# Patient Record
Sex: FEMALE | Race: WHITE | Employment: FULL TIME | URBAN - METROPOLITAN AREA
[De-identification: names, ages, dates, MRNs, and addresses within clinical notes are randomized per-mention and may not be internally consistent; named-entity substitution may affect disease eponyms.]

---

## 2018-01-25 ENCOUNTER — TELEPHONE (OUTPATIENT)
Dept: CARDIOLOGY CLINIC | Age: 58
End: 2018-01-25

## 2018-01-25 PROBLEM — I20.0 UNSTABLE ANGINA (HCC): Status: ACTIVE | Noted: 2018-01-25

## 2022-08-26 ENCOUNTER — APPOINTMENT (OUTPATIENT)
Dept: CT IMAGING | Age: 62
End: 2022-08-26
Payer: COMMERCIAL

## 2022-08-26 ENCOUNTER — HOSPITAL ENCOUNTER (EMERGENCY)
Age: 62
Discharge: HOME | End: 2022-08-27
Attending: STUDENT IN AN ORGANIZED HEALTH CARE EDUCATION/TRAINING PROGRAM
Payer: COMMERCIAL

## 2022-08-26 ENCOUNTER — APPOINTMENT (OUTPATIENT)
Dept: GENERAL RADIOLOGY | Age: 62
End: 2022-08-26
Payer: COMMERCIAL
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
## 2022-08-26 DIAGNOSIS — S09.90XA INJURY OF HEAD, INITIAL ENCOUNTER: ICD-10-CM

## 2022-08-26 LAB
ANION GAP SERPL CALCULATED.3IONS-SCNC: 14 MMOL/L (ref 3–16)
BASOPHILS ABSOLUTE: 0.1 K/UL (ref 0–0.2)
BASOPHILS RELATIVE PERCENT: 0.9 %
BUN BLDV-MCNC: 16 MG/DL (ref 7–20)
CALCIUM SERPL-MCNC: 9.5 MG/DL (ref 8.3–10.6)
CHLORIDE BLD-SCNC: 97 MMOL/L (ref 99–110)
CO2: 25 MMOL/L (ref 21–32)
CREAT SERPL-MCNC: 0.6 MG/DL (ref 0.6–1.1)
EOSINOPHILS ABSOLUTE: 0.1 K/UL (ref 0–0.6)
EOSINOPHILS RELATIVE PERCENT: 1.6 %
GFR AFRICAN AMERICAN: >60
GFR NON-AFRICAN AMERICAN: >60
GLUCOSE BLD-MCNC: 89 MG/DL (ref 70–99)
HCT VFR BLD CALC: 38.1 % (ref 36–48)
HEMOGLOBIN: 13 G/DL (ref 12–16)
INR BLD: 1.27 (ref 0.87–1.14)
LYMPHOCYTES ABSOLUTE: 2.6 K/UL (ref 1–5.1)
LYMPHOCYTES RELATIVE PERCENT: 44.5 %
MAGNESIUM: 2 MG/DL (ref 1.8–2.4)
MCH RBC QN AUTO: 31.7 PG (ref 26–34)
MCHC RBC AUTO-ENTMCNC: 34 G/DL (ref 31–36)
MCV RBC AUTO: 93.1 FL (ref 80–100)
MONOCYTES ABSOLUTE: 0.5 K/UL (ref 0–1.3)
MONOCYTES RELATIVE PERCENT: 9.2 %
NEUTROPHILS ABSOLUTE: 2.5 K/UL (ref 1.7–7.7)
NEUTROPHILS RELATIVE PERCENT: 43.8 %
PDW BLD-RTO: 13 % (ref 12.4–15.4)
PLATELET # BLD: 351 K/UL (ref 135–450)
PMV BLD AUTO: 6.7 FL (ref 5–10.5)
POTASSIUM SERPL-SCNC: 4.6 MMOL/L (ref 3.5–5.1)
PROTHROMBIN TIME: 15.8 SEC (ref 11.7–14.5)
RBC # BLD: 4.1 M/UL (ref 4–5.2)
SODIUM BLD-SCNC: 136 MMOL/L (ref 136–145)
WBC # BLD: 5.8 K/UL (ref 4–11)

## 2022-08-26 PROCEDURE — 36415 COLL VENOUS BLD VENIPUNCTURE: CPT

## 2022-08-26 PROCEDURE — 73590 X-RAY EXAM OF LOWER LEG: CPT

## 2022-08-26 PROCEDURE — 85610 PROTHROMBIN TIME: CPT

## 2022-08-26 PROCEDURE — 85025 COMPLETE CBC W/AUTO DIFF WBC: CPT

## 2022-08-26 PROCEDURE — 96374 THER/PROPH/DIAG INJ IV PUSH: CPT

## 2022-08-26 PROCEDURE — 72125 CT NECK SPINE W/O DYE: CPT

## 2022-08-26 PROCEDURE — 83735 ASSAY OF MAGNESIUM: CPT

## 2022-08-26 PROCEDURE — 96376 TX/PRO/DX INJ SAME DRUG ADON: CPT

## 2022-08-26 PROCEDURE — 73521 X-RAY EXAM HIPS BI 2 VIEWS: CPT

## 2022-08-26 PROCEDURE — 73610 X-RAY EXAM OF ANKLE: CPT

## 2022-08-26 PROCEDURE — 6370000000 HC RX 637 (ALT 250 FOR IP): Performed by: INTERNAL MEDICINE

## 2022-08-26 PROCEDURE — 99285 EMERGENCY DEPT VISIT HI MDM: CPT

## 2022-08-26 PROCEDURE — 6360000002 HC RX W HCPCS: Performed by: INTERNAL MEDICINE

## 2022-08-26 PROCEDURE — 80048 BASIC METABOLIC PNL TOTAL CA: CPT

## 2022-08-26 PROCEDURE — 6360000002 HC RX W HCPCS: Performed by: EMERGENCY MEDICINE

## 2022-08-26 PROCEDURE — 70450 CT HEAD/BRAIN W/O DYE: CPT

## 2022-08-26 PROCEDURE — 71045 X-RAY EXAM CHEST 1 VIEW: CPT

## 2022-08-26 PROCEDURE — 93005 ELECTROCARDIOGRAM TRACING: CPT | Performed by: INTERNAL MEDICINE

## 2022-08-26 RX ORDER — BUTALBITAL, ACETAMINOPHEN AND CAFFEINE 50; 325; 40 MG/1; MG/1; MG/1
1 TABLET ORAL EVERY 6 HOURS PRN
Status: DISCONTINUED | OUTPATIENT
Start: 2022-08-26 | End: 2022-08-27 | Stop reason: HOSPADM

## 2022-08-26 RX ORDER — CARISOPRODOL 350 MG/1
350 TABLET ORAL 4 TIMES DAILY
Status: DISCONTINUED | OUTPATIENT
Start: 2022-08-26 | End: 2022-08-27 | Stop reason: HOSPADM

## 2022-08-26 RX ORDER — SENNA AND DOCUSATE SODIUM 50; 8.6 MG/1; MG/1
2 TABLET, FILM COATED ORAL NIGHTLY
Status: DISCONTINUED | OUTPATIENT
Start: 2022-08-26 | End: 2022-08-27 | Stop reason: HOSPADM

## 2022-08-26 RX ORDER — VALACYCLOVIR HYDROCHLORIDE 500 MG/1
500 TABLET, FILM COATED ORAL NIGHTLY
Status: DISCONTINUED | OUTPATIENT
Start: 2022-08-26 | End: 2022-08-27 | Stop reason: HOSPADM

## 2022-08-26 RX ORDER — ALPRAZOLAM 0.5 MG/1
2 TABLET ORAL DAILY
Status: DISCONTINUED | OUTPATIENT
Start: 2022-08-26 | End: 2022-08-27

## 2022-08-26 RX ORDER — ZOLPIDEM TARTRATE 5 MG/1
10 TABLET ORAL NIGHTLY PRN
Status: DISCONTINUED | OUTPATIENT
Start: 2022-08-26 | End: 2022-08-27 | Stop reason: HOSPADM

## 2022-08-26 RX ORDER — DOXEPIN HYDROCHLORIDE 25 MG/1
50 CAPSULE ORAL NIGHTLY
Status: DISCONTINUED | OUTPATIENT
Start: 2022-08-26 | End: 2022-08-27 | Stop reason: HOSPADM

## 2022-08-26 RX ORDER — NADOLOL 40 MG/1
40 TABLET ORAL 2 TIMES DAILY
Status: DISCONTINUED | OUTPATIENT
Start: 2022-08-26 | End: 2022-08-27 | Stop reason: HOSPADM

## 2022-08-26 RX ORDER — OXYCODONE AND ACETAMINOPHEN 7.5; 325 MG/1; MG/1
2 TABLET ORAL EVERY 8 HOURS PRN
Status: DISCONTINUED | OUTPATIENT
Start: 2022-08-26 | End: 2022-08-27 | Stop reason: HOSPADM

## 2022-08-26 RX ORDER — OMEPRAZOLE 20 MG/1
20 CAPSULE, DELAYED RELEASE ORAL
Status: DISCONTINUED | OUTPATIENT
Start: 2022-08-27 | End: 2022-08-27 | Stop reason: HOSPADM

## 2022-08-26 RX ORDER — TRAMADOL HYDROCHLORIDE 50 MG/1
100 TABLET ORAL EVERY 6 HOURS PRN
Status: DISCONTINUED | OUTPATIENT
Start: 2022-08-26 | End: 2022-08-27 | Stop reason: HOSPADM

## 2022-08-26 RX ORDER — NITROGLYCERIN 0.4 MG/1
0.4 TABLET SUBLINGUAL EVERY 5 MIN PRN
Status: DISCONTINUED | OUTPATIENT
Start: 2022-08-26 | End: 2022-08-27 | Stop reason: HOSPADM

## 2022-08-26 RX ORDER — FLUCONAZOLE 200 MG/1
100 TABLET ORAL ONCE
Status: DISCONTINUED | OUTPATIENT
Start: 2022-08-28 | End: 2022-08-27 | Stop reason: HOSPADM

## 2022-08-26 RX ORDER — WARFARIN SODIUM 7.5 MG/1
7.5 TABLET ORAL NIGHTLY
Status: DISCONTINUED | OUTPATIENT
Start: 2022-08-27 | End: 2022-08-27 | Stop reason: HOSPADM

## 2022-08-26 RX ORDER — MECOBALAMIN 5000 MCG
5 TABLET,DISINTEGRATING ORAL NIGHTLY
Status: DISCONTINUED | OUTPATIENT
Start: 2022-08-26 | End: 2022-08-27 | Stop reason: HOSPADM

## 2022-08-26 RX ORDER — OXYCODONE HYDROCHLORIDE AND ACETAMINOPHEN 5; 325 MG/1; MG/1
3 TABLET ORAL ONCE
Status: COMPLETED | OUTPATIENT
Start: 2022-08-26 | End: 2022-08-26

## 2022-08-26 RX ORDER — CEPHALEXIN 500 MG/1
500 CAPSULE ORAL EVERY 8 HOURS SCHEDULED
Status: DISCONTINUED | OUTPATIENT
Start: 2022-08-26 | End: 2022-08-27 | Stop reason: HOSPADM

## 2022-08-26 RX ADMIN — HYDROMORPHONE HYDROCHLORIDE 1 MG: 1 INJECTION, SOLUTION INTRAMUSCULAR; INTRAVENOUS; SUBCUTANEOUS at 23:35

## 2022-08-26 RX ADMIN — HYDROMORPHONE HYDROCHLORIDE 0.5 MG: 1 INJECTION, SOLUTION INTRAMUSCULAR; INTRAVENOUS; SUBCUTANEOUS at 15:57

## 2022-08-26 RX ADMIN — OXYCODONE HYDROCHLORIDE AND ACETAMINOPHEN 3 TABLET: 5; 325 TABLET ORAL at 17:15

## 2022-08-26 RX ADMIN — SENNOSIDES AND DOCUSATE SODIUM 2 TABLET: 50; 8.6 TABLET ORAL at 23:17

## 2022-08-26 RX ADMIN — HYDROMORPHONE HYDROCHLORIDE 1 MG: 1 INJECTION, SOLUTION INTRAMUSCULAR; INTRAVENOUS; SUBCUTANEOUS at 19:32

## 2022-08-26 ASSESSMENT — ENCOUNTER SYMPTOMS
CHEST TIGHTNESS: 0
BLOOD IN STOOL: 0
CONSTIPATION: 0
WHEEZING: 0
SHORTNESS OF BREATH: 0
SORE THROAT: 0
NAUSEA: 0
ABDOMINAL PAIN: 0
RHINORRHEA: 0
VOMITING: 0
COUGH: 0
ABDOMINAL DISTENTION: 0
DIARRHEA: 0
COLOR CHANGE: 0

## 2022-08-26 ASSESSMENT — PAIN DESCRIPTION - PAIN TYPE: TYPE: ACUTE PAIN

## 2022-08-26 ASSESSMENT — PAIN DESCRIPTION - ORIENTATION: ORIENTATION: LEFT

## 2022-08-26 ASSESSMENT — PAIN DESCRIPTION - LOCATION
LOCATION: HEAD;HIP
LOCATION: HIP

## 2022-08-26 ASSESSMENT — PAIN DESCRIPTION - FREQUENCY: FREQUENCY: CONTINUOUS

## 2022-08-26 ASSESSMENT — PAIN SCALES - GENERAL
PAINLEVEL_OUTOF10: 9
PAINLEVEL_OUTOF10: 8
PAINLEVEL_OUTOF10: 8
PAINLEVEL_OUTOF10: 9
PAINLEVEL_OUTOF10: 8
PAINLEVEL_OUTOF10: 8

## 2022-08-26 ASSESSMENT — PAIN DESCRIPTION - DESCRIPTORS: DESCRIPTORS: SHARP

## 2022-08-26 ASSESSMENT — PAIN - FUNCTIONAL ASSESSMENT
PAIN_FUNCTIONAL_ASSESSMENT: PREVENTS OR INTERFERES WITH MANY ACTIVE NOT PASSIVE ACTIVITIES
PAIN_FUNCTIONAL_ASSESSMENT: 0-10

## 2022-08-26 ASSESSMENT — PAIN DESCRIPTION - ONSET: ONSET: SUDDEN

## 2022-08-26 NOTE — ED PROVIDER NOTES
[facility] RESIDENT NOTE       Date of evaluation: 8/26/2022    Chief Complaint     Fall (Pt unsure of how she fell; c/o left hip, ankle, and head pain. Did hit head. On blood thinners)      History of Present Illness     Demetrio Mathew is a 63 y.o. female who presents to the emergency department due to a witnessed fall on Coumadin, with head injury. Her past medical history involves PE, DVT, COVID infection x2, anemia, protein S deficiency, degenerative disc disease, supraventricular tachycardia status post ablation, fibromyalgia, unstable angina, thrush, shoulder bursitis, hyperlipidemia, migraine, and thrush. The patient [place] the mall earlier [place], when she rounded a cash register and went down. She somewhat recalls the events surrounding this, however she does not recall the fall itself, and lost consciousness briefly for about 20 seconds. She states this is the second such fall that she has had since Saturday, when she fell at a movie theater. She attributes her falling to an ankle fracture, for which she had to have pins placed. Since that happened, she has been unable to move her foot correctly in order to walk without issues. She states that she usually falls on her left side, which is the case we find today. She additionally has left hip tenderness, and left ankle tenderness, as well as left distal thigh tenderness. She does endorse a migraine headache, which is chronic for her, which is now about 5 out of 10 in intensity. The pain in her left hip is 9-10 out of 10 intensity. She otherwise denies fever, chills, sore throat, cough, chest pain, shortness of breath, diarrhea, constipation, changes to urinary habits, or dysuria. Review of Systems     Review of Systems   Constitutional:  Negative for appetite change, chills and fever. HENT:  Negative for congestion, hearing loss, rhinorrhea and sore throat.     Eyes:  Negative for visual disturbance. Respiratory:  Negative for cough, chest tightness, shortness of breath and wheezing. Cardiovascular:  Positive for leg swelling (Left ankle, chronic since fracture). Negative for chest pain and palpitations. Gastrointestinal:  Negative for abdominal distention, abdominal pain, blood in stool, constipation, diarrhea, nausea and vomiting. Endocrine: Negative for polyuria. Genitourinary:  Negative for difficulty urinating and dysuria. Musculoskeletal:  Positive for arthralgias (Left hip, left ankle) and gait problem (Secondary to left ankle pins). Negative for myalgias. Skin:  Negative for color change, pallor and rash. Neurological:  Negative for dizziness, seizures, syncope, facial asymmetry, weakness, light-headedness and headaches. Psychiatric/Behavioral:  Negative for behavioral problems, confusion and hallucinations. Past Medical, Surgical, Family, and Social History     She has a past medical history of Anemia, B12 deficiency anemia, Bleeding disorder ([address] Blood clot, Chronic pain syndrome, Degenerative disc disease, Fibromyalgia, Hyperlipidemia, Insomnia, Migraine, Opiate dependence ([address] Protein C deficiency ([address] and Tachycardia. She has a past surgical history that includes Ankle surgery (2009); lumbar discectomy (2008); Atrial ablation surgery (1996); Hysterectomy (2007); and Appendectomy (2005). Her family history includes Diabetes in her father; Heart Disease in her father; High Blood Pressure in her father and mother. She reports that she has never smoked. She has never used smokeless tobacco. She reports current alcohol use. She reports that she does not use drugs.     Medications     Previous Medications    ALPRAZOLAM (XANAX XR) 2 MG XR TABLET    TAKE 1 TABLET BY MOUTH EVERY MORNING    BUDESONIDE (RHINOCORT AQUA) 32 MCG/ACT NASAL SPRAY    TWO SPRAYS TO EACH NOSTRIL ONCE DAILY    BUTALBITAL-ACETAMINOPHEN-CAFFEINE (FIORICET, ESGIC) -40 MG PER appearance. She is not ill-appearing, toxic-appearing or diaphoretic. HENT:      Head:      Comments: Likely hematoma over the left parietal scalp     Right Ear: External ear normal.      Left Ear: External ear normal.      Nose: Nose normal. No congestion or rhinorrhea. Mouth/Throat:      Mouth: Mucous membranes are moist.      Pharynx: Oropharynx is clear. Eyes:      Extraocular Movements: Extraocular movements intact. Conjunctiva/sclera: Conjunctivae normal.      Pupils: Pupils are equal, round, and reactive to light. Cardiovascular:      Rate and Rhythm: Normal rate and regular rhythm. Pulses: Normal pulses. Heart sounds: Normal heart sounds. No murmur heard. No friction rub. No gallop. Pulmonary:      Effort: Pulmonary effort is normal. No respiratory distress. Breath sounds: Normal breath sounds. No wheezing or rales. Abdominal:      General: Abdomen is flat. Bowel sounds are normal. There is no distension. Palpations: Abdomen is soft. There is no mass. Tenderness: There is no abdominal tenderness. There is no guarding or rebound. Hernia: No hernia is present. Musculoskeletal:         General: No swelling, tenderness, deformity or signs of injury. Normal range of motion. Cervical back: Neck supple. Right lower leg: No edema. Left lower leg: Edema (Chronic) present. Skin:     General: Skin is warm and dry. Capillary Refill: Capillary refill takes less than 2 seconds. Coloration: Skin is not jaundiced or pale. Neurological:      General: No focal deficit present. Mental Status: She is alert and oriented to person, place, and time. Mental status is at baseline. Cranial Nerves: No cranial nerve deficit. Psychiatric:         Mood and Affect: Mood normal.         Behavior: Behavior normal.         Thought Content:  Thought content normal.         Judgment: Judgment normal.       Diagnostic Results     EKG   Interpreted inconjunction with emergency department physician Alisha Mares MD  Rhythm: normal sinus   Rate: normal  Axis: normal  Ectopy: none  Conduction: normal  ST Segments: no acute change  T Waves: no acute change  Q Waves: none  Clinical Impression: no acute changes  Comparison:  1/25/2018    RADIOLOGY:  CT CERVICAL SPINE WO CONTRAST   Final Result      1. No evidence of fracture or traumatic subluxation in the cervical spine. 2.  Degenerative disc disease and degenerative joint disease. CT HEAD WO CONTRAST   Final Result      1. No intracranial abnormality   2. Chronic left maxillary sinusitis. Component of high attenuation could be seen with inspissated secretions or superimposed noninvasive fungal sinusitis. XR HIP BILATERAL W AP PELVIS (2 VIEWS)   Final Result      No fracture      XR CHEST PORTABLE   Final Result      No evidence for acute cardiopulmonary disease. XR ANKLE LEFT (MIN 3 VIEWS)   Final Result      No acute bone abnormality      XR ANKLE RIGHT (MIN 3 VIEWS)   Final Result      1.  No fracture      XR TIBIA FIBULA LEFT (2 VIEWS)   Final Result      No acute bone abnormality          LABS:   Results for orders placed or performed during the hospital encounter of 08/26/22   Protime-INR   Result Value Ref Range    Protime 15.8 (H) 11.7 - 14.5 sec    INR 1.27 (H) 0.87 - 1.14   CBC with Auto Differential   Result Value Ref Range    WBC 5.8 4.0 - 11.0 K/uL    RBC 4.10 4.00 - 5.20 M/uL    Hemoglobin 13.0 12.0 - 16.0 g/dL    Hematocrit 38.1 36.0 - 48.0 %    MCV 93.1 80.0 - 100.0 fL    MCH 31.7 26.0 - 34.0 pg    MCHC 34.0 31.0 - 36.0 g/dL    RDW 13.0 12.4 - 15.4 %    Platelets 165 245 - 391 K/uL    MPV 6.7 5.0 - 10.5 fL    Neutrophils % 43.8 %    Lymphocytes % 44.5 %    Monocytes % 9.2 %    Eosinophils % 1.6 %    Basophils % 0.9 %    Neutrophils Absolute 2.5 1.7 - 7.7 K/uL    Lymphocytes Absolute 2.6 1.0 - 5.1 K/uL    Monocytes Absolute 0.5 0.0 - 1.3 K/uL    Eosinophils Absolute 0.1 0.0 - 0.6 K/uL    Basophils Absolute 0.1 0.0 - 0.2 K/uL   Basic Metabolic Panel   Result Value Ref Range    Sodium 136 136 - 145 mmol/L    Potassium 4.6 3.5 - 5.1 mmol/L    Chloride 97 (L) 99 - 110 mmol/L    CO2 25 21 - 32 mmol/L    Anion Gap 14 3 - 16    Glucose 89 70 - 99 mg/dL    BUN 16 7 - 20 mg/dL    Creatinine 0.6 0.6 - 1.1 mg/dL    GFR Non-African American >60 >60    GFR African American >60 >60    Calcium 9.5 8.3 - 10.6 mg/dL   Magnesium   Result Value Ref Range    Magnesium 2.00 1.80 - 2.40 mg/dL       ED BEDSIDE ULTRASOUND:  No results found. RECENT VITALS:  BP: (!) 154/90, Temp: 97.4 °F (36.3 °C),Heart Rate: 73, Resp: 18, SpO2: 97 %     Procedures     EKG  Head CT without contrast  CT of the cervical spine without contrast  X-ray of the bilateral hips  X-ray of the left femur  X-ray of the left tibia/fibula      ED Course     Nursing Notes, Past Medical Hx, Past Surgical Hx, Social Hx, Allergies, and FamilyHx were reviewed.     ED Course as of 08/26/22 2110   Fri Aug 26, 2022   1438 EKG 12 Lead [MJ]   1438 Protime-INR [MJ]   3987 Basic Metabolic Panel [MJ]   0492 CBC with Auto Differential [MJ]   1438 Magnesium [MJ]   1438 CT HEAD WO CONTRAST [MJ]   1438 CT CERVICAL SPINE WO CONTRAST [MJ]   1438 XR HIP BILATERAL W AP PELVIS (2 VIEWS) [MJ]   1438 XR LEG LENGTH EVALUATION [MJ]      ED Course User Index  [MJ] Ren Ross MD       The patient was giventhe following medications:  Orders Placed This Encounter   Medications    HYDROmorphone (DILAUDID) injection 0.5 mg    oxyCODONE-acetaminophen (PERCOCET) 5-325 MG per tablet 3 tablet    HYDROmorphone (DILAUDID) injection 1 mg    melatonin disintegrating tablet 5 mg    cephALEXin (KEFLEX) capsule 500 mg     Order Specific Question:   Antimicrobial Indications     Answer:   Bone and Joint Infection    oxyCODONE-acetaminophen (PERCOCET) 7.5-325 MG per tablet 2 tablet    traMADol (ULTRAM) tablet 100 mg    butalbital-acetaminophen-caffeine December. Previously, she had a history of PE and DVT that happened over 10 years ago. She was briefly on anticoagulation for those events, however stopped about 10 years ago, until resuming Coumadin this December. She has a left ankle fracture with multiple pins in it. For this reason, her gait has been unstable, leading to falls. Prior to today, her last fall was on Saturday at a movie theater, when a similar situation happened. The patient denies any prodromal symptoms, and it does not appear that she was getting up from sitting down or lying down when she lost consciousness and fell, although it is unclear whether or not she lost consciousness or fell first.  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 She has been offered surgery by a physician for this, and has so far declined. I discussed with the patient that with repair of her left labrum, she may, after physical rehabilitation, have a decreased chance of mechanical fall. She expressed understanding and appears to wish to reconsider. Of note, the patient recently had an appointment with her physician, and was found to have a infratherapeutic INR. Her Coumadin dose was adjusted based on that a few days ago. Of note, the patient has a septic knee. She has been seeing her primary care physician for this, and was prescribed Keflex on Wednesday. PT/INR is 15.8/1. 27. BMP shows chloride of 97. Magnesium is 2.00. CBC grossly within normal limits. EKG shows normal sinus rhythm, unchanged since 1/25/2019. Head CT without contrast shows no acute intracranial abnormality. CT of the cervical spine without contrast shows no evidence of acute fracture or subluxation, with re-demonstration of degenerative disc and joint disease. X-ray of the bilateral hips, tibia, fibula, and femur, show no acute fracture.   Chest x-ray shows no acute cardiopulmonary disease. The patient could not walk significantly on reexamination later in the day. She has steps to get past in her current living situation, and a cohabitant who is not able to fully provide the patient her needs given her current situation. Patient will be kept overnight in the emergency department for observation. Tomorrow morning, physical therapy will see the patient, and make their recommendations based on what they find. The patient's home medications were reconciled and ordered. This patient was also evaluated by the attending physician. All care plans were discussed and agreed upon. Clinical Impression     1. Fall, initial encounter    2. Injury of head, initial encounter        Disposition     PATIENT REFERRED TO:  Fuad Diallo MD  [address] [facility] [place]    Schedule an appointment as soon as possible for a visit in 1 week      DISCHARGE MEDICATIONS:  New Prescriptions    No medications on file       DISPOSITION Ed Observation 08/26/2022 09:06:25 PM      Ivy Tapia MD  Resident  08/26/22 9010       Ivy Tapia MD  Resident  08/26/22 6558

## 2022-08-26 NOTE — ED PROVIDER NOTES
ED Attending Attestation Note     Date of evaluation: 8/26/2022    This patient was seen by the resident. I have seen and examined the patient, agree with the workup, evaluation, management and diagnosis. The care plan has been discussed. My assessment reveals 19-year-old female presenting with a mechanical fall on Eliquis turning to the left. She states that this is second time this week that she has had this fall. Denies any lightheadedness or chest pain prior to the falls. She states that she hit her head and has a headache. She has migraines on a daily basis. She denies any nausea or vomiting. She has some mild cervical spine tenderness. My examination she is alert and orient x4. She has tenderness palpation along the left lumbar paraspinal region nothing over the midline including the cervical thoracic, lumbar spine. She is neurovascular intact distally. We will be obtaining imaging and basic labs.      Linden Gill MD  08/26/22 [id number]

## 2022-08-27 VITALS
DIASTOLIC BLOOD PRESSURE: 73 MMHG | SYSTOLIC BLOOD PRESSURE: 103 MMHG | HEIGHT: 69 IN | HEART RATE: 74 BPM | OXYGEN SATURATION: 99 % | RESPIRATION RATE: 15 BRPM | BODY MASS INDEX: 26.66 KG/M2 | TEMPERATURE: 97.4 F | WEIGHT: 180 LBS

## 2022-08-27 PROCEDURE — 97116 GAIT TRAINING THERAPY: CPT

## 2022-08-27 PROCEDURE — 97530 THERAPEUTIC ACTIVITIES: CPT

## 2022-08-27 PROCEDURE — 97162 PT EVAL MOD COMPLEX 30 MIN: CPT

## 2022-08-27 PROCEDURE — 97535 SELF CARE MNGMENT TRAINING: CPT

## 2022-08-27 PROCEDURE — 97165 OT EVAL LOW COMPLEX 30 MIN: CPT

## 2022-08-27 PROCEDURE — 6370000000 HC RX 637 (ALT 250 FOR IP): Performed by: INTERNAL MEDICINE

## 2022-08-27 RX ORDER — ALPRAZOLAM 0.5 MG/1
2 TABLET ORAL DAILY
Status: DISCONTINUED | OUTPATIENT
Start: 2022-08-27 | End: 2022-08-27 | Stop reason: HOSPADM

## 2022-08-27 RX ADMIN — DOXEPIN HYDROCHLORIDE 50 MG: 25 CAPSULE ORAL at 00:30

## 2022-08-27 RX ADMIN — NADOLOL 40 MG: 40 TABLET ORAL at 00:29

## 2022-08-27 RX ADMIN — Medication 5 MG: at 00:29

## 2022-08-27 RX ADMIN — OMEPRAZOLE 20 MG: 20 CAPSULE, DELAYED RELEASE ORAL at 09:14

## 2022-08-27 RX ADMIN — NADOLOL 40 MG: 40 TABLET ORAL at 09:14

## 2022-08-27 RX ADMIN — VALACYCLOVIR HYDROCHLORIDE 500 MG: 500 TABLET, FILM COATED ORAL at 00:31

## 2022-08-27 RX ADMIN — CARISOPRODOL 350 MG: 350 TABLET ORAL at 08:07

## 2022-08-27 RX ADMIN — CEPHALEXIN 500 MG: 500 CAPSULE ORAL at 08:06

## 2022-08-27 RX ADMIN — WARFARIN SODIUM 7.5 MG: 7.5 TABLET ORAL at 04:08

## 2022-08-27 ASSESSMENT — PAIN SCALES - GENERAL: PAINLEVEL_OUTOF10: 6

## 2022-08-27 NOTE — ED NOTES
Returned 1 tab Keflex, 2 tabs Percocet, and 4 tabs (total 2mg) Xanax to the Pyxis with Jeremy Bookbinder. Pyxis saying there is a partial dose. Discussed with Charge YOLANDA Mendez.      Doc YOLANDA Krishnamurthy  08/26/22 5723

## 2022-08-27 NOTE — ED PROVIDER NOTES
[facility] Emergency Department  ED Observation Disposition Note   Emergency Physicians         Diagnostic Evaluation      Diagnostic studies germane to this period of clinical observation include:    CT head/c-spine  XR hips, b/l ankles, L tib/fib  CXR  CBC, BMP, PT/INR     Consultant(s) Final Recommendations      - Outpatient PT 2-3x/wk  - No need for OT  - Use walker at home     Impression and Plan      Ashlyn Rogers has been cared for according to the standard [facility] observation protocol for PT/OT eval following multiple falls with left hip pain. This extended period of observation was specifically required to determine the need for hospitalization. Prior to discharge from observation, the final physical exam is documented below. Significant events during the course of observation based on the goals of the clinical problem list include:    - PT/OT eval    Based on the patient's condition, clinical response, and diagnostic information obtained during this period of observation, the plan is to Discharge to home. The total length of observation was 14 hours. Dr. Shay Etienne is the observation disposition attending. Patient was seen by PT/OT who recommended using a walker at home as well as following up with outpatient physical therapy in 2 weeks. Patient has a pre-existing relationship with an orthopedic surgeon who is already aware of the labral issues in her left hip. The patient will follow-up with:    - Physical therapy  - Her orthopedic surgeon    As appropriate, please see the AVS for comprehensive discharge instructions. Subjective      Ashlyn Rogers has undergone comprehensive diagnostic evaluation and therapeutic management in accordance with the CDU guidelines for PT/OT eval.  At the time of disposition, the patient was in stable condition and was able to ambulate independently using a walker.      Physical Examination      Physical Exam  Constitutional: General: She is not in acute distress. Appearance: Normal appearance. She is not toxic-appearing. HENT:      Head: Normocephalic and atraumatic. Nose: No congestion or rhinorrhea. Mouth/Throat:      Mouth: Mucous membranes are moist.   Eyes:      Conjunctiva/sclera: Conjunctivae normal.   Cardiovascular:      Rate and Rhythm: Normal rate and regular rhythm. Pulmonary:      Effort: Pulmonary effort is normal.   Abdominal:      General: Abdomen is flat. Musculoskeletal:         General: No deformity. Cervical back: Normal range of motion. Skin:     General: Skin is warm and dry. Neurological:      General: No focal deficit present. Mental Status: She is alert and oriented to person, place, and time.    Psychiatric:         Mood and Affect: Mood normal.         Behavior: Behavior normal.           Warren Sahu MD  08/27/22 2105

## 2022-08-27 NOTE — ED PROVIDER NOTES
[facility] Emergency Department  ED Observation Progress Note   Emergency Physicians          DiagnosticEvaluation        Labs Reviewed   PROTIME-INR - Abnormal; Notable for the following components:       Result Value    Protime 15.8 (*)     INR 1.27 (*)     All other components within normal limits   BASIC METABOLIC PANEL - Abnormal; Notable for the following components:    Chloride 97 (*)     All other components within normal limits   CBC WITH AUTO DIFFERENTIAL   MAGNESIUM           Assessment and Plan      Libby Lockhart continues to be managed in accordance with the observation clinical guidelines for weakness. An update of her clinical problem list includes:    - Generalized weakness and falls -patient has had her home medications ordered and is currently pending evaluation by social work, case management, PT and OT. Subjective      Libby Lockhart has been admitted to the observation unit for 14 hours. Serial assessments of her clinical progress include:    No physical complaints since the time of my assuming care of the patient       Physical Examination      Physical Exam  Constitutional:       Appearance: Normal appearance. Pulmonary:      Effort: Pulmonary effort is normal.   Abdominal:      General: Abdomen is flat. Skin:     General: Skin is dry.    Psychiatric:         Mood and Affect: Mood normal.         Behavior: Behavior normal.               Vivi Lopez MD  08/27/22 [id number]

## 2022-08-27 NOTE — PROGRESS NOTES
Physical Therapy  Facility/Department: [facility] EMERGENCY DEPARTMENT  Physical Therapy Initial Assessment and Treatment    Name: Ashlyn Rogers  : 1960  MRN: [id number]  Date of Service: 2022    Discharge Recommendations:    Ashlyn Rogers scored a 18/24 on the AM-PAC short mobility form. Current research shows that an AM-PAC score of 18 or greater is typically associated with a discharge to the patient's home setting. Based on the patient's AM-PAC score and their current functional mobility deficits, it is recommended that the patient have 2-3 sessions per week of Physical Therapy at d/c to increase the patient's independence. At this time, this patient demonstrates the endurance and safety to discharge home with OP PT and a follow up treatment frequency of 2-3x/wk. Please see assessment section for further patient specific details. If patient discharges prior to next session this note will serve as a discharge summary. Please see below for the latest assessment towards goals. PT Equipment Recommendations  Equipment Needed: Yes (rolling walker if doesn't have)      Patient Diagnosis(es): The primary encounter diagnosis was Fall, initial encounter. A diagnosis of Injury of head, initial encounter was also pertinent to this visit. Past Medical History:  has a past medical history of Anemia, B12 deficiency anemia, Bleeding disorder ([address] Blood clot, Chronic pain syndrome, Degenerative disc disease, Fibromyalgia, Hyperlipidemia, Insomnia, Migraine, Opiate dependence ([address] Protein C deficiency ([address] and Tachycardia. Past Surgical History:  has a past surgical history that includes Ankle surgery (); lumbar discectomy (); Atrial ablation surgery (); Hysterectomy (); and Appendectomy (). Assessment   Assessment: Pt with decreased independent mobility from baseline s/p 2 falls. Pt normally independent with mobility without AD.   Currently with LLE pain and difficulty weightbearing. Improved mobility with use of walker. Demonstrating transfers/gt with SB/supervision. Pt plans to return home at d/c - roommate able to provide some support. Rec OP PT follow up. Discussed with pt who verbalized understanding  Treatment Diagnosis: impaired functional mobility 2/2 LLE s/p fall  Decision Making: Medium Complexity  Requires PT Follow-Up: No     Plan   Plan  Plan: Discharge with evaluation only  Safety Devices  Type of Devices: Nurse notified, Bed alarm in place, Left in bed, Call light within reach     Restrictions  Position Activity Restriction  Other position/activity restrictions: no activity restrictions     Subjective   Pain: headache (7/10); L hip (7/10) - nurse aware and medicated  General  Chart Reviewed: Yes  63 y.o. F who presents to the emergency department due to a witnessed fall on Coumadin, with head injury. L ankle Xray: neg. CXR: neg. L tib/fib Xray: neg. R ankle Xray: neg. Bilat hip Xray: neg. Head CT: neg.  CT Cspine: neg. PMH: PE, DVT, COVID infection x2, anemia, protein S deficiency, degenerative disc disease, supraventricular tachycardia status post ablation, fibromyalgia, unstable angina, thrush, shoulder bursitis, hyperlipidemia, migraine, thrush, L ankle fx w/ pins. Referring Practitioner: Jacob Duong MD  Referral Date : 08/27/22  Subjective  Subjective: Pt found supine. Agreeable to PT. Reports pain in L hip.   Had pain medicine earlier         Social/Functional History  Social/Functional History  Lives With: Other (comment) (roommate)  Type of Home: Condo  Home Layout: Multi-level, Laundry in basement, 1/2 bath on main level, Bed/Bath upstairs  Home Access: Stairs to enter with rails (3 MELANIE w/ rail)  Bathroom Shower/Tub: Walk-in shower  Bathroom Toilet: Handicap height  Bathroom Equipment: Hand-held shower  Bathroom Accessibility: Accessible  Home Equipment:  (walker (unsure if wheels or not))  Has the patient had two or more falls in the past year or any fall with injury in the past year?: Yes (2 falls recent (one misjudged step - last Sat; yesterday - possibly trip))  ADL Assistance: [address]: Independent  Ambulation Assistance: Independent  Transfer Assistance: Independent  Active : Yes  Occupation: Full time employment  Type of Occupation: manager at store  Additional Comments: Reports roommate works from home - able to assist some but she just had shoulder surgery  Vision/Hearing  Vision  Vision: Within Functional Limits  Hearing  Hearing: Within functional limits    Cognition   Orientation  Overall Orientation Status: Within Functional Limits     Objective                 AROM RLE (degrees)  RLE AROM: WFL  AROM LLE (degrees)  LLE AROM :  (AAROM WFL - pt uses UE to assist with hip/knee flexion, AROM ankle WFL)  Strength RLE  Strength RLE: WFL  Strength LLE  Strength LLE: WFL          Bed mobility  Supine to Sit: Stand by assistance (HOB elevated)  Sit to Supine: Supervision (using gt belt to assist LLE into bed)  Transfers  Sit to Stand: Stand by assistance  Stand to sit: Supervision  Ambulation  Device: Rolling Walker  Assistance: Supervision  Quality of Gait: cues for sequencing, decreased weightbearing/stance time LLE, decreased L knee flexion in swing phase  Distance: 50' x 2  Comments: Attempted ambulation without AD - pt able to take one step with CG/min HHA - pain and difficulty weightbearing LLE  Stairs/Curb  Stairs?:  (Discussed with pt technique on steps - step to pattern vs scooting on bottom. 3 [address]EL[address]IE home are curb steps so discussed using walker on these.   Pt verbalized understanding.)         Treatment included: bed mobility, transfers, gt, pt education          OutComes Score                                                  AM-PAC Score  AM-PAC Inpatient Mobility Raw Score : 18 (08/27/22 2389)  AM-PAC Inpatient T-Scale Score : 43.63 (08/27/22 9423)  Mobility Inpatient CMS 0-100% Score: 46.58 (08/27/22 3275)  Mobility Inpatient CMS G-Code Modifier : CK (08/27/22 0839)          Tinneti Score       Goals  Short Term Goals  Time Frame for Short term goals: No goals set- pt SB/Supervision with mobility with walker       Education  Patient Education  Education Given To: Patient  Education Provided: Role of Therapy;Plan of Care;IADL Safety  Education Provided Comments: use of walker, technique on steps, LLE ROM as tolerated, ice/heat as needed, OP follow up  Education Method: Verbal  Education Outcome: Verbalized understanding      Therapy Time   Individual Concurrent Group Co-treatment   Time In 0815         Time Out 2361         Minutes 39             Timed Code Treatment Minutes: 24      Total Treatment Minutes:  [facility],

## 2022-08-27 NOTE — CARE COORDINATION
Case management consult noted to see Ms. Sunita Mckinney in the emergency department. Met with her at the bedside. She is alert and oriented x 4, pleasant, and easy to engage in conversation. Ms. Sunita Mckinney stated she lives at home with a roommate in a multi-level condo. She is independent with all self care and functional mobility at baseline. She is not currently active with any services. She stated she worked with PT/OT today and did well. She plans to return home. Her roommate has any DME she would need. She is not interested in home care. Her roommate will provide transportation.     Electronically signed by FAVIAN Jones RN-[facility]  Case Management  [phone]

## 2022-08-27 NOTE — PROGRESS NOTES
Occupational Therapy  Facility/Department: [address] [place] [address]  Occupational Therapy Initial Assessment and Treatment  Discharge    Name: Ayla Roberson  : 1960  MRN: [id number]  Date of Service: 2022    Discharge Recommendations:  Ayla Roberson scored a 23/24 on the AM-PAC ADL Inpatient form. Current research shows that an AM-PAC score of 18 or greater is typically associated with a discharge to the patient's home setting. OT Equipment Recommendations  Equipment Needed: Yes  Mobility Devices: ADL Assistive Devices  ADL Assistive Devices: Shower Chair with back       Patient Diagnosis(es): The primary encounter diagnosis was Fall, initial encounter. A diagnosis of Injury of head, initial encounter was also pertinent to this visit. Past Medical History:  has a past medical history of Anemia, B12 deficiency anemia, Bleeding disorder ([address] Blood clot, Chronic pain syndrome, Degenerative disc disease, Fibromyalgia, Hyperlipidemia, Insomnia, Migraine, Opiate dependence ([address] Protein C deficiency ([address] and Tachycardia. Past Surgical History:  has a past surgical history that includes Ankle surgery (); lumbar discectomy (); Atrial ablation surgery (); Hysterectomy (); and Appendectomy (). Assessment   Assessment: Presenting s/p fall. C/o headache and L hip pain -LLE pain worsened w/ WB. Demo functional mobility and transfers w/ SBA progressing to Supervision. Able to perform ADLs w/ increased time/effort. Plans to return home w/ prn A of roommate (works from home). No further OT needs indicated. Discussed recommendation to use shower chair for improved safety. Pt denies concerns for safe discharge home. Will sign off - communicated w/ nurse.   Decision Making: Low Complexity  REQUIRES OT FOLLOW-UP: No  Activity Tolerance  Activity Tolerance: Patient Tolerated treatment well;Patient limited by pain        Plan   Discharge acute OT - no furhter OT needs.     Restrictions  Position Activity Restriction  Other position/activity restrictions: no activity restrictions    Subjective   General  Chart Reviewed: Yes  Additional Pertinent Hx: 63 y.o. F who presents to the emergency department due to a witnessed fall on Coumadin, with head injury. PMH: PE, DVT, COVID infection x2, anemia, protein S deficiency, degenerative disc disease, supraventricular tachycardia status post ablation, fibromyalgia, unstable angina, thrush, shoulder bursitis, hyperlipidemia, migraine, thrush, L ankle fx w/ pins. Family / Caregiver Present: No  Referring Practitioner: Dr. Eva Diego    Subjective  Subjective: In bed on arrival. Agreeable to therapy. Expressing she is satisfied w/ mobility using RW. \"I'm surprised. \"    headache (7/10); L hip (7/10) - nurse aware and medicated    Social/Functional History  Social/Functional History  Lives With: Other (comment) (roommate)  Type of Home: Condo  Home Layout: Multi-level, Laundry in basement, 1/2 bath on main level, Bed/Bath upstairs  Home Access: Stairs to enter with rails (3 MELANIE w/ rail)  Bathroom Shower/Tub: Walk-in shower  Bathroom Toilet: Handicap height  Bathroom Equipment: Hand-held shower  Bathroom Accessibility: Accessible  Home Equipment:  (walker (unsure if wheels or not))  Has the patient had two or more falls in the past year or any fall with injury in the past year?: Yes (2 falls recent (one misjudged step - last Sat; yesterday - possibly trip))  ADL Assistance: Independent  Homemaking Assistance: Independent  Ambulation Assistance: Independent  Transfer Assistance: Independent  Active : Yes  Occupation: Full time employment  Type of Occupation: manager at store       Objective             Safety Devices  Type of Devices: Nurse notified; Bed alarm in place; Left in bed;Call light within reach    Balance  Sitting: Intact  Standing:  (SBA progressing to Supervision)    Gait  Overall Level of Assistance: Supervision (SBA progressing to Supervision; decreased WB through LLE - initially not putting weight on forefoot, improved w/ cues)    Toilet Transfers  Toilet - Technique: Ambulating (using RW)  Equipment Used: Standard toilet (w/ bar)  Toilet Transfer: Supervision    AROM: Within functional limits  Strength: Within functional limits  Coordination: Within functional limits    ADL  Grooming: Supervision (hand hygiene at sink level)  LE Dressing: Stand by assistance (increased time/effort 2* c/o L hip pain)  Toileting: Supervision ((+) void)             Transfers  Sit to stand: Supervision  Stand to sit: Supervision  Transfer Comments: Note: cues for safe hand placement during transitions 2* novelty of device, demo learning    Vision  Vision: Within Functional Limits  Hearing  Hearing: Within functional limits    Cognition  Overall Cognitive Status: WFL  Orientation  Overall Orientation Status: Within Functional Limits                    Education Given To: Patient  Education Provided: Role of Therapy;Plan of Care;ADL Adaptive Strategies;Transfer Training  Education Method: Verbal  Barriers to Learning: None  Education Outcome: Verbalized understanding;Demonstrated understanding                   Pt seen by OT for eval and treat.  Treatment included: bed mobility, functional transfer/mobility, ADL, pt education                                                           AM-PAC Score        [facility] Inpatient Daily Activity Raw Score: 23 (08/27/22 1019)  AM-PAC Inpatient ADL T-Scale Score : 51.12 (08/27/22 1019)  ADL Inpatient CMS 0-100% Score: 15.86 (08/27/22 1019)  ADL Inpatient CMS G-Code Modifier : CI (08/27/22 1019)           Therapy Time   Individual Concurrent Group Co-treatment   Time In 0815         Time Out 0853         Minutes 38          Timed Code Treatment Minutes:   23    Total Treatment Minutes:  [facility] OTR/L #[id number]

## 2022-08-29 LAB
EKG ATRIAL RATE: 64 BPM
EKG DIAGNOSIS: NORMAL
EKG P AXIS: 58 DEGREES
EKG P-R INTERVAL: 154 MS
EKG Q-T INTERVAL: 424 MS
EKG QRS DURATION: 74 MS
EKG QTC CALCULATION (BAZETT): 437 MS
EKG R AXIS: 37 DEGREES
EKG T AXIS: 40 DEGREES
EKG VENTRICULAR RATE: 64 BPM